# Patient Record
Sex: FEMALE | Race: WHITE | Employment: PART TIME | URBAN - METROPOLITAN AREA
[De-identification: names, ages, dates, MRNs, and addresses within clinical notes are randomized per-mention and may not be internally consistent; named-entity substitution may affect disease eponyms.]

---

## 2022-10-13 ENCOUNTER — HOSPITAL ENCOUNTER (EMERGENCY)
Age: 22
Discharge: HOME | End: 2022-10-13
Attending: EMERGENCY MEDICINE
Payer: COMMERCIAL

## 2022-10-13 VITALS
DIASTOLIC BLOOD PRESSURE: 74 MMHG | HEIGHT: 63 IN | BODY MASS INDEX: 24.3 KG/M2 | RESPIRATION RATE: 16 BRPM | WEIGHT: 137.13 LBS | HEART RATE: 118 BPM | OXYGEN SATURATION: 97 % | TEMPERATURE: 99.7 F | SYSTOLIC BLOOD PRESSURE: 109 MMHG

## 2022-10-13 DIAGNOSIS — N30.01 ACUTE CYSTITIS WITH HEMATURIA: Primary | ICD-10-CM

## 2022-10-13 LAB
BACTERIA: ABNORMAL /HPF
BILIRUBIN URINE: NEGATIVE
BLOOD, URINE: ABNORMAL
CLARITY: ABNORMAL
COLOR: YELLOW
EPITHELIAL CELLS, UA: ABNORMAL /HPF (ref 0–5)
GLUCOSE URINE: NEGATIVE MG/DL
HCG(URINE) PREGNANCY TEST: NEGATIVE
KETONES, URINE: NEGATIVE MG/DL
LEUKOCYTE ESTERASE, URINE: ABNORMAL
MICROSCOPIC EXAMINATION: YES
NITRITE, URINE: NEGATIVE
PH UA: 7 (ref 5–8)
PROTEIN UA: NEGATIVE MG/DL
RBC UA: ABNORMAL /HPF (ref 0–4)
SPECIFIC GRAVITY UA: 1.02 (ref 1–1.03)
URINE REFLEX TO CULTURE: YES
URINE TYPE: ABNORMAL
UROBILINOGEN, URINE: 0.2 E.U./DL
WBC UA: ABNORMAL /HPF (ref 0–5)

## 2022-10-13 PROCEDURE — 87088 URINE BACTERIA CULTURE: CPT

## 2022-10-13 PROCEDURE — 99283 EMERGENCY DEPT VISIT LOW MDM: CPT

## 2022-10-13 PROCEDURE — 87186 SC STD MICRODIL/AGAR DIL: CPT

## 2022-10-13 PROCEDURE — 87086 URINE CULTURE/COLONY COUNT: CPT

## 2022-10-13 PROCEDURE — 84703 CHORIONIC GONADOTROPIN ASSAY: CPT

## 2022-10-13 PROCEDURE — 81001 URINALYSIS AUTO W/SCOPE: CPT

## 2022-10-13 RX ORDER — NORETHINDRONE ACETATE AND ETHINYL ESTRADIOL 1MG-20(21)
1 KIT ORAL DAILY
COMMUNITY
Start: 2022-07-25

## 2022-10-13 RX ORDER — MONTELUKAST SODIUM 10 MG/1
10 TABLET ORAL DAILY
COMMUNITY
Start: 2022-10-06

## 2022-10-13 RX ORDER — SULFAMETHOXAZOLE AND TRIMETHOPRIM 800; 160 MG/1; MG/1
1 TABLET ORAL 2 TIMES DAILY
Qty: 14 TABLET | Refills: 0 | Status: SHIPPED | OUTPATIENT
Start: 2022-10-13 | End: 2022-10-20

## 2022-10-13 RX ORDER — SERTRALINE HYDROCHLORIDE 25 MG/1
25 TABLET, FILM COATED ORAL DAILY
COMMUNITY
Start: 2022-09-16

## 2022-10-13 RX ORDER — PREDNISONE 10 MG/1
10 TABLET ORAL 2 TIMES DAILY
COMMUNITY
Start: 2022-10-06

## 2022-10-13 ASSESSMENT — PAIN DESCRIPTION - PAIN TYPE: TYPE: ACUTE PAIN

## 2022-10-13 ASSESSMENT — PAIN - FUNCTIONAL ASSESSMENT: PAIN_FUNCTIONAL_ASSESSMENT: 0-10

## 2022-10-13 ASSESSMENT — PAIN DESCRIPTION - FREQUENCY: FREQUENCY: CONTINUOUS

## 2022-10-13 ASSESSMENT — PAIN DESCRIPTION - ORIENTATION: ORIENTATION: LEFT;LOWER

## 2022-10-13 ASSESSMENT — PAIN DESCRIPTION - LOCATION: LOCATION: ABDOMEN;BACK

## 2022-10-13 ASSESSMENT — PAIN SCALES - GENERAL: PAINLEVEL_OUTOF10: 7

## 2022-10-13 ASSESSMENT — PAIN DESCRIPTION - DESCRIPTORS: DESCRIPTORS: ACHING;STABBING

## 2022-10-13 NOTE — ED NOTES
Discharge instructions reviewed with patient. Patient verbalized understanding. Provided education on prevention of UTI's per patients request. Prescription sent to the pharmacy.  Patient provided with a school note     Polly Michael RN  10/13/22 [id number]

## 2022-10-13 NOTE — Clinical Note
Ovidio Stephens was seen and treated in our emergency department on 10/13/2022. She may return to work on 10/14/2022. If you have any questions or concerns, please don't hesitate to call.       Margareth Jay MD

## 2022-10-13 NOTE — ED NOTES
Elevated heart rate. Patient stated was high with the family doctor. Manually checked heart rate and heart rate elevated. Dr. Cecilia lyles.       Thuy Barksdale, RN  10/13/22 [address] [place] [address], RN  10/13/22 [address] [place] [address], RN  10/13/22 1201

## 2022-10-13 NOTE — DISCHARGE INSTRUCTIONS
Drink plenty of water. Take Advil as needed. Finish off your Bactrim antibiotic which should last 1 week. Take it twice a day.

## 2022-10-13 NOTE — ED TRIAGE NOTES
Left lower quadrant patient stated has been going on for a long while. Patient stated since taking Prednisone for poison ivy  pain has become worse.

## 2022-10-13 NOTE — ED PROVIDER NOTES
eMERGENCY dEPARTMENT eNCOUnter      Pt Name: Prasanth Yañez  MRN: [id number]  [place] 2000  Date of evaluation: 10/13/2022  Provider: MD Sai Ramos       Chief Complaint   Patient presents with    Abdominal Pain     Left lower quadrant patient stated has been going on for a long while. Patient stated since taking Prednisone for poison ivy  pain has become worse. HISTORY OF PRESENT ILLNESS   (Location/Symptom, Timing/Onset,Context/Setting, Quality, Duration, Modifying Factors, Severity) Note limiting factors. HPI    Prasanth Yañez is a 25 y.o. female who presents to the emergency department with left-sided abdominal pain that radiates to her back. Patient states she is about midcycle her last period was 2 weeks ago. Has some pain mostly at the end of urination. Patient has history of UTI in the past.  Patient denies any nausea or vomiting at this time. There has been no fever. Nursing Notes were reviewed. REVIEW OFSYSTEMS    (2+ for level 4; 10+ for level 5)   Review of Systems    General: No fevers, chills or night sweats, No weight loss    Head:  No Sore throat,  No Ear Pain    Chest:  Nontender. No Cough, No SOB,  Chest Pain    GI: No abdominal pain or vomiting    : Mild dysuria without hematuria    Musculoskeletal: No unrelenting pain or night pain    Neurologic: No bowel or bladder incontinence, No saddle anesthesia, No leg weakness    All other systems reviewed and are negative.         PAST MEDICAL HISTORY     Past Medical History:   Diagnosis Date    Anxiety        SURGICAL HISTORY       Past Surgical History:   Procedure Laterality Date    NASAL SEPTUM SURGERY Bilateral     TONSILLECTOMY         CURRENT MEDICATIONS       Previous Medications    MONTELUKAST (SINGULAIR) 10 MG TABLET    Take 10 mg by mouth daily    NORETHINDRONE-ETHINYL ESTRADIOL (JUNEL FE 1/20) 1-20 MG-MCG PER TABLET    Take 1 tablet by mouth daily    PREDNISONE (DELTASONE) 10 MG TABLET    Take 10 mg by mouth in the morning and 10 mg in the evening. SERTRALINE (ZOLOFT) 25 MG TABLET    Take 25 mg by mouth daily    SERTRALINE (ZOLOFT) 50 MG TABLET    Take 50 mg by mouth daily       ALLERGIES     Cephalosporins    FAMILY HISTORY     History reviewed. No pertinent family history. SOCIAL HISTORY       Social History     Socioeconomic History    Marital status: Single     Spouse name: None    Number of children: None    Years of education: None    Highest education level: None   Tobacco Use    Smoking status: Some Days     Types: E-Cigarettes     Start date: 4/1/2022    Smokeless tobacco: Current   Substance and Sexual Activity    Alcohol use: Yes     Comment: socially    Drug use: Never       SCREENINGS           PHYSICAL EXAM    (up to 7 for level 4, 8 or more for level 5)     ED Triage Vitals [10/13/22 1149]   BP Temp Temp Source Heart Rate Resp SpO2 Height Weight   127/81 99 °F (37.2 °C) Oral (!) 130 16 96 % 5' 3\" (1.6 m) 137 lb 2 oz (62.2 kg)       Physical Exam    General: Alert and awake ×3. Nontoxic appearance. Well-developed well-nourished 58-year-old in no acute  HEENT: Normocephalic atraumatic. Neck is supple. Airway intact. No adenopathy  Cardiac: Rapid rate and normal rhythm with no murmurs rubs or gallops  Pulmonary: Lungs are clear in all lung fields. No wheezing. No Rales. Abdomen: Soft and nontender. Negative hepatosplenomegaly. Bowel sounds are active  Extremities: Moving all extremities. No calf tenderness. Peripheral pulses all intact  Skin: No skin lesions. No rashes  Neurologic: Cranial nerves II through XII was grossly intact. Nonfocal neurological exam  Psychiatric: Patient is pleasant. Mood is appropriate. DIAGNOSTIC RESULTS     EKG (Per Emergency Physician):       RADIOLOGY (Per Emergency Physician): Interpretation per the Radiologist below, if available at the time of this note:  No results found.     ED BEDSIDE ULTRASOUND:   Performed by ED Physician - none    LABS:  Labs Reviewed   URINALYSIS WITH REFLEX TO CULTURE - Abnormal; Notable for the following components:       Result Value    Blood, Urine MODERATE (*)     Leukocyte Esterase, Urine TRACE (*)     All other components within normal limits   MICROSCOPIC URINALYSIS - Abnormal; Notable for the following components:    WBC, UA 10-20 (*)     RBC, UA 11-20 (*)     Bacteria, UA 2+ (*)     All other components within normal limits   CULTURE, URINE   PREGNANCY, URINE        All other labs were within normal range or not returned as of this dictation. Procedures      EMERGENCY DEPARTMENT COURSE and DIFFERENTIAL DIAGNOSIS/MDM:   Vitals:    Vitals:    10/13/22 1149 10/13/22 1249   BP: 127/81 109/74   Pulse: (!) 130 (!) 118   Resp: 16 16   Temp: 99 °F (37.2 °C) 99.7 °F (37.6 °C)   TempSrc: Oral Oral   SpO2: 96% 97%   Weight: 137 lb 2 oz (62.2 kg)    Height: 5' 3\" (1.6 m)        Medications - No data to display    MDM  . Patient is a 73-year-old with some pain with dysuria and radiation to the back. No hematuria that she can notice. She has history of UTI. Patient is afebrile. But on exam patient is tachycardic and flushed. I suspect she spiked a low-grade fever. Patient's not vomiting. Patient has been hydrated. Patient is urine shows a UTI or cystitis some hematuria. Patient placed on Bactrim for 1 week. Patient will follow-up. REVAL:         CRITICAL CARE TIME   Total CriticalCare time was 0 minutes, excluding separately reportable procedures. There was a high probability of clinically significant/life threatening deterioration in the patient's condition which required my urgent intervention. CONSULTS:  None    PROCEDURES:  Unless otherwise noted below, none     [unfilled]    FINAL IMPRESSION      1.  Acute cystitis with hematuria          DISPOSITION/PLAN   DISPOSITION Decision To Discharge 10/13/2022 12:45:11 PM      PATIENT REFERRED TO:  Kiya Prieto    Schedule an appointment as soon as possible for a visit in 1 week  If symptoms worsen    DISCHARGE MEDICATIONS:  New Prescriptions    SULFAMETHOXAZOLE-TRIMETHOPRIM (BACTRIM DS) 800-160 MG PER TABLET    Take 1 tablet by mouth 2 times daily for 7 days          (Please note:  Portions of this note were completed with a voice recognition program.Efforts were made to edit the dictations but occasionally words and phrases are mis-transcribed.)  Form v2016. J.[id number]    VANCE COLLAZO MD (electronically signed)  Emergency Medicine Provider        Emily Vogel MD  10/13/22 6674

## 2022-10-15 LAB
ORGANISM: ABNORMAL
URINE CULTURE, ROUTINE: ABNORMAL